# Patient Record
Sex: FEMALE
[De-identification: names, ages, dates, MRNs, and addresses within clinical notes are randomized per-mention and may not be internally consistent; named-entity substitution may affect disease eponyms.]

---

## 2022-11-28 PROBLEM — Z00.00 ENCOUNTER FOR PREVENTIVE HEALTH EXAMINATION: Status: ACTIVE | Noted: 2022-11-28

## 2022-11-28 NOTE — HISTORY OF PRESENT ILLNESS
[de-identified] : Ms. Turcios is a 43 y/o woman who comes in for evaluation for lower back pain that started

## 2022-11-28 NOTE — PHYSICAL EXAM
[LE] : Sensory: Intact in bilateral lower extremities [Normal RLE] : Right Lower Extremity: No scars, rashes, lesions, ulcers, skin intact [Normal LLE] : Left Lower Extremity: No scars, rashes, lesions, ulcers, skin intact [Normal Touch] : sensation intact for touch [Normal] : Oriented to person, place, and time, insight and judgement were intact and the affect was normal [de-identified] : Lumbar spine [de-identified] : \par \par X-rays taken today of lumbar spine AP and lateral views show

## 2022-11-30 ENCOUNTER — APPOINTMENT (OUTPATIENT)
Dept: ORTHOPEDIC SURGERY | Facility: CLINIC | Age: 42
End: 2022-11-30

## 2022-11-30 ENCOUNTER — APPOINTMENT (OUTPATIENT)
Dept: ORTHOPEDIC SURGERY | Facility: CLINIC | Age: 42
End: 2022-11-30
Payer: MEDICAID

## 2022-11-30 VITALS
HEIGHT: 62 IN | HEART RATE: 106 BPM | DIASTOLIC BLOOD PRESSURE: 75 MMHG | WEIGHT: 118 LBS | OXYGEN SATURATION: 94 % | BODY MASS INDEX: 21.71 KG/M2 | SYSTOLIC BLOOD PRESSURE: 110 MMHG

## 2022-11-30 DIAGNOSIS — M25.551 PAIN IN RIGHT HIP: ICD-10-CM

## 2022-11-30 DIAGNOSIS — M54.16 RADICULOPATHY, LUMBAR REGION: ICD-10-CM

## 2022-11-30 PROCEDURE — 99203 OFFICE O/P NEW LOW 30 MIN: CPT

## 2022-11-30 PROCEDURE — 72083 X-RAY EXAM ENTIRE SPI 4/5 VW: CPT

## 2022-12-07 ENCOUNTER — OUTPATIENT (OUTPATIENT)
Dept: OUTPATIENT SERVICES | Facility: HOSPITAL | Age: 42
LOS: 1 days | End: 2022-12-07

## 2022-12-07 ENCOUNTER — APPOINTMENT (OUTPATIENT)
Dept: MRI IMAGING | Facility: CLINIC | Age: 42
End: 2022-12-07

## 2022-12-07 ENCOUNTER — RESULT REVIEW (OUTPATIENT)
Age: 42
End: 2022-12-07

## 2022-12-07 PROCEDURE — 72148 MRI LUMBAR SPINE W/O DYE: CPT | Mod: 26

## 2022-12-07 PROCEDURE — 73721 MRI JNT OF LWR EXTRE W/O DYE: CPT | Mod: 26,RT

## 2022-12-16 ENCOUNTER — APPOINTMENT (OUTPATIENT)
Dept: ORTHOPEDIC SURGERY | Facility: CLINIC | Age: 42
End: 2022-12-16
Payer: MEDICAID

## 2022-12-16 PROCEDURE — 99443: CPT

## 2022-12-16 NOTE — REASON FOR VISIT
[Home] : at home, [unfilled] , at the time of the visit. [Medical Office: ([facility])___] : at the medical office located in

## 2023-01-06 NOTE — ADDENDUM
[FreeTextEntry1] : Documented by Scarlett Bradley acting as a scribe for Dr. Aniceto Gutierrez on 12/16/2022.

## 2023-01-06 NOTE — HISTORY OF PRESENT ILLNESS
[de-identified] : Ms. Turcios was seen for telephonic review. She has continued to have the same issues as the last visit. She got an MRI of the lumbar spine and right hip. The lumbar spine was reviewed by me and found to have no significant neurocompressive lesion or structural pathology. The right hip MRI was reviewed by me and was found to have right hip severe trochanteric bursitis. I recommended that she seek the care of Dr. Cisse of physiatry for treatment of her severe trochanteric bursitis in the form of an injection. I also discussed with her that she may need physical therapy indefinitely. Dr. Cisse will direct that. She will follow up with me on an as needed basis. All questions were answered.

## 2023-01-06 NOTE — END OF VISIT
[FreeTextEntry3] : All medical record entries made by the Scribe were at my, Dr. Aniceto Gutierrez, direction and personally dictated by me on 12/16/2022. I have reviewed the chart and agree that the record accurately reflects my personal performance of the history, physical exam, assessment and plan. I have also personally directed, reviewed, and agreed with the chart.

## 2023-01-23 NOTE — PHYSICAL EXAM
[de-identified] : Physical Exam:\par \par General: patient is well developed, well nourished, in no acute \par distress, alert and oriented x 3. \par \par Mood and affect: normal\par \par Respiratory: no respiratory distress noted\par \par Skin: no scars over spine, skin intact, no erythema, increased warmth\par \par Alignment:The spine is well compensated in the coronal and sagittal plane.  \par \par Gait: The patient is able to toe walk and heel walk without difficulty. The patient is able to tandem gait without difficutly.\par \par Palpation: no tenderness to palpation spine or paraspinal region\par \par Range of motion: Lumbar spine ROM is restricted\par \par Neurologic Exam:\par Motor: Manual Muscle testing in the lower extremities is 5 out of 5 in all muscle groups. There is no evidence of muscular atrophy in the lower extremities. Sensory: Sensation to light touch is grossly intact in the lower extremities\par \par Reflexes: DTR are present and symmetric throughout, no clonus, plantar responses are flexor\par \par Hip Exam: +ttp right GTB\par \par Special tests: Straight leg raise test negative.  Cross straight leg test negative.  MARTHA test negative\par \par Vascular: Examination of the peripheral vascular system demonstrates no evidence of congestion or edema. no evidence of lymphedema bilateral lower extremities, pulses are present and symmetric in both lower extremities. [de-identified] : XR full spine AP/lateral, lumbar flexion/extension 11/30/22 (my read): vertebral body and disc space heights well preserved, no spondylolisthesis or dynamic instability, no scoliosis, no fractures seen

## 2023-01-23 NOTE — HISTORY OF PRESENT ILLNESS
[de-identified] : Here for spinal consultation.  complains of lower back pain, right hip pain.  Pain ranges between 4-8.  worse with sitting.  better with standing.  denies numbness/tingling/weakness.

## 2023-01-23 NOTE — DISCUSSION/SUMMARY
[de-identified] : Recommend Right hip and Lumbar MRI without contrast.\par Follow up after imaging performed.